# Patient Record
Sex: FEMALE | Race: WHITE | NOT HISPANIC OR LATINO | Employment: FULL TIME | URBAN - METROPOLITAN AREA
[De-identification: names, ages, dates, MRNs, and addresses within clinical notes are randomized per-mention and may not be internally consistent; named-entity substitution may affect disease eponyms.]

---

## 2021-04-28 ENCOUNTER — OFFICE VISIT (OUTPATIENT)
Dept: FAMILY MEDICINE CLINIC | Facility: CLINIC | Age: 26
End: 2021-04-28

## 2021-04-28 VITALS
TEMPERATURE: 97.7 F | HEART RATE: 95 BPM | OXYGEN SATURATION: 99 % | SYSTOLIC BLOOD PRESSURE: 102 MMHG | DIASTOLIC BLOOD PRESSURE: 70 MMHG | BODY MASS INDEX: 22.02 KG/M2 | HEIGHT: 66 IN | WEIGHT: 137 LBS

## 2021-04-28 DIAGNOSIS — Z12.83 SCREENING EXAM FOR SKIN CANCER: ICD-10-CM

## 2021-04-28 DIAGNOSIS — F07.81 POST CONCUSSIVE SYNDROME: Primary | ICD-10-CM

## 2021-04-28 DIAGNOSIS — Z12.4 PAP SMEAR FOR CERVICAL CANCER SCREENING: ICD-10-CM

## 2021-04-28 PROCEDURE — 99203 OFFICE O/P NEW LOW 30 MIN: CPT | Performed by: FAMILY MEDICINE

## 2021-04-28 RX ORDER — METHYLPHENIDATE HYDROCHLORIDE 36 MG/1
36 TABLET ORAL EVERY MORNING
Qty: 30 TABLET | Refills: 0 | Status: SHIPPED | OUTPATIENT
Start: 2021-04-28 | End: 2021-06-09 | Stop reason: SDUPTHER

## 2021-04-28 RX ORDER — FAMOTIDINE 40 MG/1
TABLET, FILM COATED ORAL
COMMUNITY
Start: 2021-03-10 | End: 2021-04-28

## 2021-04-28 NOTE — PROGRESS NOTES
Riley Marcial is a 25 y.o. female who presents today to [facility] care.    Chief Complaint   Patient presents with   • [facility]     Patient recently moved from [facility]        She recently moved to [place] for work from [place], [place]. She last saw her PCP 1 year ago. Patient has no acute complaints but requests a refill of her Concerta today. She has been taking it for difficulty concentrating since she was 17 after a concussive head injury for which she was treated at [facility]. She notes that she has been out since last July, but due to the pandemic and work she was unable to get in to get it refilled. She would like to get back on it today, however, because she is staring graduate school in 2 weeks. She is up to date on all her vaccinations and is scheduled for her second COVID shot next week. She does not currently see an eye doctor. She has never had a pap smear and would like to be referred to a gyn in [place]. She would also like to see a dermatologist here as well as she has several moles on her trunk that are raised and have become irritating to her. Her last labs were in 2019 and she would like her thyroid checked today due to family history.        PHQ-9 Depression Screening  Little interest or pleasure in doing things? 0   Feeling down, depressed, or hopeless? 0   Trouble falling or staying asleep, or sleeping too much?     Feeling tired or having little energy?     Poor appetite or overeating?     Feeling bad about yourself - or that you are a failure or have let yourself or your family down?     Trouble concentrating on things, such as reading the newspaper or watching television?     Moving or speaking so slowly that other people could have noticed? Or the opposite - being so fidgety or restless that you have been moving around a lot more than usual?     Thoughts that you would be better off dead, or of hurting yourself in some way?     PHQ-9 Total  "Score 0   If you checked off any problems, how difficult have these problems made it for you to do your work, take care of things at home, or get along with other people?         Past Medical History:   Diagnosis Date   • Mental confusion         Past Surgical History:   Procedure Laterality Date   • TONSILLECTOMY          Family History   Problem Relation Age of Onset   • Hypothyroidism Mother    • Hyperthyroidism Father    • Depression Sister    • Anxiety disorder Sister    • Stroke Sister         at birth with migraines and seizures   • Lupus Maternal Grandmother    • Colon cancer Maternal Grandfather 72   • Stroke Paternal Grandmother    • Dementia Paternal Grandmother    • Heart attack Paternal Grandfather    • Transient ischemic attack Paternal Grandfather    • Dementia Paternal Grandfather         Social History     Socioeconomic History   • Marital status: Single     Spouse name: Not on file   • Number of children: Not on file   • Years of education: Not on file   • Highest education level: Not on file   Tobacco Use   • Smoking status: Never Smoker   • Smokeless tobacco: Never Used   Substance and Sexual Activity   • Alcohol use: Yes     Comment: socially   • Drug use: Never   • Sexual activity: Yes     Birth control/protection: Condom     Comment: 1 male partner in the last year         No LMP recorded.    No current outpatient medications on file prior to visit.     No current facility-administered medications on file prior to visit.       Allergies   Allergen Reactions   • Penicillins Hives        Visit Vitals  /70   Pulse 95   Temp 97.7 °F (36.5 °C)   Ht 167.6 cm (66\")   Wt 62.1 kg (137 lb)   SpO2 99%   Breastfeeding No   BMI 22.11 kg/m²        Physical Exam  Vitals reviewed.   Constitutional:       Appearance: Normal appearance. She is normal weight.   HENT:      Head: Normocephalic and atraumatic.   Eyes:      Extraocular Movements: Extraocular movements intact.   Cardiovascular:      Rate " and Rhythm: Normal rate.      Pulses: Normal pulses.      Heart sounds: Normal heart sounds.   Pulmonary:      Effort: Pulmonary effort is normal.      Breath sounds: Normal breath sounds.   Skin:     General: Skin is warm and dry.          Neurological:      General: No focal deficit present.      Mental Status: She is alert and oriented to person, place, and time. Mental status is at baseline.   Psychiatric:         Behavior: Behavior normal.               Problems Addressed this Visit        Genitourinary and Reproductive     Pap smear for cervical cancer screening    Relevant Orders    Ambulatory Referral to Obstetrics / Gynecology       Neuro    Post concussive syndrome - Primary     Psychological condition is unchanged.  Continue current treatment regimen.  CSA agreement was signed.  UDS was obtained.  Dusty was reviewed and was appropriate.  Psychological condition  will be reassessed in 3 months.         Relevant Medications    methylphenidate (Concerta) 36 MG CR tablet    Other Relevant Orders    Pain Management Profile (13 Drugs) Urine - Urine, Clean Catch       Skin    Screening exam for skin cancer     Patient has multiple nevi on her trunk which have been increasing in size which is likely secondary to friction from her bra.  Will refer to dermatology for further evaluation and treatment.         Relevant Orders    Ambulatory Referral to Dermatology      Diagnoses       Codes Comments    Post concussive syndrome    -  Primary ICD-10-CM: F07.81  ICD-9-CM: 310.2     Screening exam for skin cancer     ICD-10-CM: Z12.83  ICD-9-CM: V76.43     Pap smear for cervical cancer screening     ICD-10-CM: Z12.4  ICD-9-CM: V76.2           Return in about 3 months (around 7/28/2021) for Follow-up post concussive syndrome.    Parts of this office note have been dictated by voice recognition software. Grammatical and/or spelling errors may be present.    Kayode Burns MD   4/29/2021

## 2021-04-29 PROBLEM — Z12.83 SCREENING EXAM FOR SKIN CANCER: Status: ACTIVE | Noted: 2021-04-29

## 2021-04-29 PROBLEM — Z12.4 PAP SMEAR FOR CERVICAL CANCER SCREENING: Status: ACTIVE | Noted: 2021-04-29

## 2021-04-29 NOTE — ASSESSMENT & PLAN NOTE
Patient has multiple nevi on her trunk which have been increasing in size which is likely secondary to friction from her bra.  Will refer to dermatology for further evaluation and treatment.

## 2021-04-29 NOTE — ASSESSMENT & PLAN NOTE
Psychological condition is unchanged.  Continue current treatment regimen.  CSA agreement was signed.  UDS was obtained.  Dusty was reviewed and was appropriate.  Psychological condition  will be reassessed in 3 months.

## 2021-06-09 DIAGNOSIS — F07.81 POST CONCUSSIVE SYNDROME: ICD-10-CM

## 2021-06-09 RX ORDER — METHYLPHENIDATE HYDROCHLORIDE 36 MG/1
36 TABLET ORAL EVERY MORNING
Qty: 30 TABLET | Refills: 0 | Status: SHIPPED | OUTPATIENT
Start: 2021-06-09 | End: 2021-08-31 | Stop reason: SDUPTHER

## 2021-08-25 ENCOUNTER — OFFICE VISIT (OUTPATIENT)
Dept: OBSTETRICS AND GYNECOLOGY | Facility: CLINIC | Age: 26
End: 2021-08-25

## 2021-08-25 VITALS
WEIGHT: 125 LBS | SYSTOLIC BLOOD PRESSURE: 106 MMHG | BODY MASS INDEX: 20.18 KG/M2 | RESPIRATION RATE: 14 BRPM | DIASTOLIC BLOOD PRESSURE: 64 MMHG

## 2021-08-25 DIAGNOSIS — Z30.09 GENERAL COUNSELING AND ADVICE FOR CONTRACEPTIVE MANAGEMENT: ICD-10-CM

## 2021-08-25 DIAGNOSIS — Z01.419 ENCNTR FOR GYN EXAM (GENERAL) (ROUTINE) W/O ABN FINDINGS: Primary | ICD-10-CM

## 2021-08-25 PROCEDURE — 99385 PREV VISIT NEW AGE 18-39: CPT | Performed by: OBSTETRICS & GYNECOLOGY

## 2021-08-25 NOTE — PROGRESS NOTES
Subjective   Chief Complaint   Patient presents with   • Gynecologic Exam     Riley Marcial is a 25 y.o. year old  presenting to be seen for her annual exam.     SEXUAL Hx:  She is currently sexually active.  In the past year there there has been NO new sexual partners.    Condoms are always used.  She would not like to be screened for STD's at today's exam.  Current birth control method: condoms.  She is not happy with her current method of contraception and does not want to discuss alternative methods of contraception.  MENSTRUAL Hx:  Patient's last menstrual period was 2021 (approximate).  In the past 6 months her cycles have been regular, predictable and occur monthly.  Her menstrual flow is typically normal.   Each month on average there are roughly 0 day(s) of very heavy flow.    Intermenstrual bleeding is absent.    Post-coital bleeding is absent.  Dysmenorrhea: moderate and is not affecting her activities of daily living  PMS: is not affecting her activities of daily living  Her cycles are not a source of concern for her that she wishes to discuss today.  HEALTH Hx:  She exercises regularly: no (and has no plans to become more active).  She wears her seat belt: yes.  She has concerns about domestic violence: no.  OTHER THINGS SHE WANTS TO DISCUSS TODAY:  Nothing else    The following portions of the patient's history were reviewed and updated as appropriate:problem list, current medications, allergies, past family history, past medical history, past social history and past surgical history.    Social History    Tobacco Use      Smoking status: Never Smoker      Smokeless tobacco: Never Used    Review of Systems  Constitutional POS: nothing reported    NEG: anorexia or night sweats   Genitourinary POS: nothing reported    NEG: dysuria or hematuria      Gastointestinal POS: nothing reported    NEG: bloating, change in bowel habits, melena or reflux symptoms   Integument POS: nothing reported     NEG: moles that are changing in size, shape, color or rashes   Breast POS: nothing reported    NEG: persistent breast lump, skin dimpling or nipple discharge        Objective   /64   Resp 14   Wt 56.7 kg (125 lb)   LMP 08/20/2021 (Approximate)   Breastfeeding No   BMI 20.18 kg/m²     General:  well developed; well nourished  no acute distress   Skin:  No suspicious lesions seen   Thyroid: normal to inspection and palpation   Breasts:  Examined in supine position  Symmetric without masses or skin dimpling  Nipples normal without inversion, lesions or discharge  There are no palpable axillary nodes   Abdomen: soft, non-tender; no masses  no umbilical or inguinal hernias are present  no hepato-splenomegaly   Pelvis: Clinical staff was present for exam  External genitalia:  normal appearance of the external genitalia including Bartholin's and [place]'s glands.  :  urethral meatus normal;  Vaginal:  normal pink mucosa without prolapse or lesions.  Cervix:  normal appearance.  Uterus:  normal size, shape and consistency.  Adnexa:  normal bimanual exam of the adnexa.        Assessment   1. Normal GYN exam  2. Contraception Counseling     Plan   Pap was done today.  If she does not receive the results of the Pap within 2 weeks  time, she was instructed to call to find out the results.  I explained to Roxobel that the recommendations for Pap smear interval in a low risk patient's has lengthened to 3 years time.  I encouraged her to be seen yearly for a full physical exam including breast and pelvic exam even during the off years when PAP's will not be performed.  1. Contraceptive options including use, efficacy and side effect profile of intrauterine device, subdermal implant, pill, patch, shot and intravaginal ring were discussed. Patient would like to proceed with IUD placement.  2. Encouraged healthy diet and exercise.   3. No prescription was given or electronically sent at today's visit  4. The importance of  keeping all planned follow-up and taking all medications as prescribed was emphasized.  5. Follow up for annual exam and for IUD placement in 1 year and in 2 weeks, respectively.     No orders of the defined types were placed in this encounter.         This note was electronically signed.    Valerie Esparza DO    August 25, 2021    Note: Speech recognition transcription software may have been used to create portions of this document.  An attempt at proofreading has been made but errors in transcription could still be present

## 2021-08-31 ENCOUNTER — OFFICE VISIT (OUTPATIENT)
Dept: FAMILY MEDICINE CLINIC | Facility: CLINIC | Age: 26
End: 2021-08-31

## 2021-08-31 VITALS
HEART RATE: 88 BPM | BODY MASS INDEX: 20.41 KG/M2 | OXYGEN SATURATION: 99 % | DIASTOLIC BLOOD PRESSURE: 62 MMHG | WEIGHT: 127 LBS | TEMPERATURE: 97 F | SYSTOLIC BLOOD PRESSURE: 102 MMHG | HEIGHT: 66 IN

## 2021-08-31 DIAGNOSIS — F07.81 POST CONCUSSIVE SYNDROME: Primary | ICD-10-CM

## 2021-08-31 PROCEDURE — 99213 OFFICE O/P EST LOW 20 MIN: CPT | Performed by: FAMILY MEDICINE

## 2021-08-31 RX ORDER — METHYLPHENIDATE HYDROCHLORIDE 36 MG/1
36 TABLET ORAL EVERY MORNING
Qty: 30 TABLET | Refills: 0 | Status: SHIPPED | OUTPATIENT
Start: 2021-08-31 | End: 2021-10-06 | Stop reason: SDUPTHER

## 2021-08-31 NOTE — PROGRESS NOTES
"Riley Marcial is a 25 y.o. female who presents today for Post concussive syndrome (med refill )      Patient is doing well on concerta. He memory and focus are both at baseline. She denies jitteriness, weight loss, appetite loss, palpitations, or difficulty sleeping. She is doing well in her grad school classes. She has no acute complaints today.       Review of Systems   Constitutional: Negative for fever and unexpected weight loss.   HENT: Negative for congestion, ear pain and sore throat.    Eyes: Negative for visual disturbance.   Respiratory: Negative for cough, shortness of breath and wheezing.    Cardiovascular: Negative for chest pain and palpitations.   Gastrointestinal: Negative for abdominal pain, blood in stool, constipation, diarrhea, nausea, vomiting and GERD.   Endocrine: Negative for polydipsia and polyuria.   Genitourinary: Negative for difficulty urinating.   Musculoskeletal: Negative for joint swelling.   Skin: Negative for rash and skin lesions.   Allergic/Immunologic: Negative for environmental allergies.   Neurological: Negative for seizures and syncope.   Hematological: Does not bruise/bleed easily.   Psychiatric/Behavioral: Negative for suicidal ideas.        The following portions of the patient's history were reviewed and updated as appropriate: allergies, current medications, past family history, past medical history, past social history, past surgical history and problem list.    Current Outpatient Medications on File Prior to Visit   Medication Sig Dispense Refill   • [DISCONTINUED] methylphenidate (Concerta) 36 MG CR tablet Take 1 tablet by mouth Every Morning 30 tablet 0     No current facility-administered medications on file prior to visit.       Allergies   Allergen Reactions   • Penicillins Hives        Visit Vitals  /62   Pulse 88   Temp 97 °F (36.1 °C)   Ht 167.6 cm (66\")   Wt 57.6 kg (127 lb)   LMP 08/20/2021 (Approximate)   SpO2 99%   BMI 20.50 kg/m²        Physical " Exam  Constitutional:       General: She is not in acute distress.     Appearance: She is well-developed. She is not diaphoretic.   HENT:      Head: Atraumatic.   Cardiovascular:      Rate and Rhythm: Normal rate and regular rhythm.      Heart sounds: Normal heart sounds. No murmur heard.   No friction rub. No gallop.    Pulmonary:      Effort: Pulmonary effort is normal. No respiratory distress.      Breath sounds: Normal breath sounds. No stridor. No wheezing, rhonchi or rales.   Musculoskeletal:      Cervical back: Normal range of motion and neck supple.   Skin:     General: Skin is warm and dry.   Neurological:      Mental Status: She is alert and oriented to person, place, and time.   Psychiatric:         Behavior: Behavior normal.          Problems Addressed this Visit        Neuro    Post concussive syndrome - Primary     Psychological condition is improving with treatment.  Continue current treatment regimen.  Psychological condition  will be reassessed in 3 months.         Relevant Medications    methylphenidate (Concerta) 36 MG CR tablet      Diagnoses       Codes Comments    Post concussive syndrome    -  Primary ICD-10-CM: F07.81  ICD-9-CM: 310.2           Return in about 3 months (around 11/30/2021) for Follow-up med refills.    Parts of this office note have been dictated by voice recognition software. Grammatical and/or spelling errors may be present.    Kayode Burns MD   8/31/2021

## 2021-09-29 ENCOUNTER — OFFICE VISIT (OUTPATIENT)
Dept: OBSTETRICS AND GYNECOLOGY | Facility: CLINIC | Age: 26
End: 2021-09-29

## 2021-09-29 VITALS
RESPIRATION RATE: 14 BRPM | SYSTOLIC BLOOD PRESSURE: 112 MMHG | DIASTOLIC BLOOD PRESSURE: 70 MMHG | WEIGHT: 126 LBS | BODY MASS INDEX: 20.34 KG/M2

## 2021-09-29 DIAGNOSIS — Z30.014 ENCOUNTER FOR INITIAL PRESCRIPTION OF INTRAUTERINE CONTRACEPTIVE DEVICE (IUD): Primary | ICD-10-CM

## 2021-09-29 PROCEDURE — 58300 INSERT INTRAUTERINE DEVICE: CPT | Performed by: OBSTETRICS & GYNECOLOGY

## 2021-09-29 NOTE — PROGRESS NOTES
IUD Insertion    Patient's last menstrual period was 09/20/2021 (approximate).    Date of procedure:  9/29/2021    Risks and benefits discussed? yes  All questions answered? yes  Consents given by The patient  Written consent obtained? yes    Local anesthesia used:  no    Procedure documentation:    After verifying the patient had a low probability of being pregnant and met the criteria for insertion, a sterile speculum has placed and the cervix was cleansed with an antiseptic solution.  Vaginal discharge was scant.  The anterior lip of the cervix was grasped with a tenaculum and the uterine cavity was gently sounded. There was mild difficulty passing the sound through the cervix.  Cervical dilation did not need to be performed prior to placing the IUD.  The uterus was midposition and sounded to 6 cms.  The Kyleena was then prepared per the manufacturers instructions.    The Kyleena was advanced to a point 2 cms from the fundus and then the arms were released from the sheath.  The device was advanced to the fundus and the device was released fully from the sheath. The string was cut 3 cms in length.  Bleeding from the cervix was scant.    She tolerated the procedure without any difficulty.     Post procedure instructions: It was reviewed that the Kyleena will not alter the timing of when she bleeds but it may decrease the quantity of flow and cramps.  Many woman will be amenorrheic over time.  Efficacy rate of 99.2% over 5 years was discussed.  Spontaneous expulsion rate of 1-2% was also discussed.  If she has any issue with fever or excessive bleeding or pain she is to call the office immediately.  Otherwise I would like to see her back in 4 weeks. Given my departure from [facility], will arrange transition of care to one of my partners.     This note was electronically signed.    Valerie Esparza,   September 29, 2021

## 2021-10-06 DIAGNOSIS — F07.81 POST CONCUSSIVE SYNDROME: ICD-10-CM

## 2021-10-06 RX ORDER — METHYLPHENIDATE HYDROCHLORIDE 36 MG/1
36 TABLET ORAL EVERY MORNING
Qty: 30 TABLET | Refills: 0 | Status: SHIPPED | OUTPATIENT
Start: 2021-10-06 | End: 2021-12-01 | Stop reason: SDUPTHER

## 2021-10-27 ENCOUNTER — OFFICE VISIT (OUTPATIENT)
Dept: OBSTETRICS AND GYNECOLOGY | Facility: CLINIC | Age: 26
End: 2021-10-27

## 2021-10-27 VITALS
SYSTOLIC BLOOD PRESSURE: 112 MMHG | BODY MASS INDEX: 19.99 KG/M2 | WEIGHT: 124.4 LBS | RESPIRATION RATE: 14 BRPM | HEIGHT: 66 IN | DIASTOLIC BLOOD PRESSURE: 74 MMHG

## 2021-10-27 DIAGNOSIS — Z30.431 IUD CHECK UP: ICD-10-CM

## 2021-10-27 DIAGNOSIS — Z30.014 ENCOUNTER FOR INITIAL PRESCRIPTION OF INTRAUTERINE CONTRACEPTIVE DEVICE (IUD): Primary | ICD-10-CM

## 2021-10-27 PROCEDURE — 99212 OFFICE O/P EST SF 10 MIN: CPT | Performed by: STUDENT IN AN ORGANIZED HEALTH CARE EDUCATION/TRAINING PROGRAM

## 2021-10-27 RX ORDER — LEVONORGESTREL 19.5 MG/1
1 INTRAUTERINE DEVICE INTRAUTERINE ONCE
COMMUNITY
End: 2023-03-22 | Stop reason: SINTOL

## 2021-10-27 NOTE — PROGRESS NOTES
"Subjective   Chief Complaint   Patient presents with   • Follow-up     Patient is here today for Kyleena IUD string check. c/o moderate cramping daily except for 3 days and that was when she was on her period.     Riley Marcial is a 25 y.o. year old  presenting to be seen for follow-up of her recently placed Kyleena on 21. Since the time of insertion she has been sexually active.  Waka has not been painful for her.   Waka has not been painful for her partner. She has had slight daily cramping since insertion that is resolved with Ibuprofen. She started her period last  and is no longer bleeding; only has slight red/pink discharge.     OTHER THINGS SHE WANTS TO DISCUSS TODAY:  Nothing else       Objective   /74 (BP Location: Right arm, Patient Position: Sitting, Cuff Size: Adult)   Resp 14   Ht 167.6 cm (66\")   Wt 56.4 kg (124 lb 6.4 oz)   LMP 10/24/2021   Breastfeeding No   BMI 20.08 kg/m²     Imaging   No data reviewed       Assessment   1. IUD in place     Plan   1. IUD strings visualized, reassurance provided.  Discussed if cramping acutely worsens or has acutely worsening bleeding, patient encouraged to return to our office for repeat evaluation and or ultrasound.  Patient verbalized understanding.  2. The importance of keeping all planned follow-up and taking all medications as prescribed was emphasized.  3. Follow up for annual exam in 1-2 years    No orders of the defined types were placed in this encounter.           This note was electronically signed.    Charla Hope MD   2021  "

## 2021-12-01 DIAGNOSIS — F07.81 POST CONCUSSIVE SYNDROME: ICD-10-CM

## 2021-12-01 RX ORDER — METHYLPHENIDATE HYDROCHLORIDE 36 MG/1
36 TABLET ORAL EVERY MORNING
Qty: 30 TABLET | Refills: 0 | Status: SHIPPED | OUTPATIENT
Start: 2021-12-01 | End: 2022-01-11 | Stop reason: SDUPTHER

## 2022-01-11 ENCOUNTER — TELEMEDICINE (OUTPATIENT)
Dept: FAMILY MEDICINE CLINIC | Facility: CLINIC | Age: 27
End: 2022-01-11

## 2022-01-11 DIAGNOSIS — F07.81 POST CONCUSSIVE SYNDROME: ICD-10-CM

## 2022-01-11 PROCEDURE — 99214 OFFICE O/P EST MOD 30 MIN: CPT | Performed by: FAMILY MEDICINE

## 2022-01-11 RX ORDER — METHYLPHENIDATE HYDROCHLORIDE 36 MG/1
36 TABLET ORAL EVERY MORNING
Qty: 30 TABLET | Refills: 0 | Status: SHIPPED | OUTPATIENT
Start: 2022-01-11 | End: 2022-03-06 | Stop reason: SDUPTHER

## 2022-01-11 NOTE — PROGRESS NOTES
Riley Marcial is a 26 y.o. female who presents today for Med Refill (post consussive sydrome)      You have chosen to receive care through a telemedicine visit. Do you consent to use a telemedicine visit for your medical care today? Yes.    Patient is working at [employer] and taking master's courses in finance.  Reports she sleeps an average of 7 hours per night.  Denies decreased appetite, or recent changes in weight.  Also denies any nervousness, heart palpitations, jittery feelings.  Patient does not have any physical complaints today.         Review of Systems   Constitutional: Negative.    HENT: Negative.    Eyes: Negative.    Respiratory: Negative.    Cardiovascular: Negative.    Gastrointestinal: Negative.    Endocrine: Negative.    Genitourinary: Negative.    Musculoskeletal: Negative.    Allergic/Immunologic: Negative.    Neurological: Negative.    Hematological: Negative.    Psychiatric/Behavioral: Negative.         The following portions of the patient's history were reviewed and updated as appropriate: allergies, current medications, past family history, past medical history, past social history, past surgical history and problem list.    Current Outpatient Medications on File Prior to Visit   Medication Sig Dispense Refill   • levonorgestrel (Kyleena) 19.5 MG intrauterine device IUD 1 each by Intrauterine route 1 (One) Time.     • [DISCONTINUED] methylphenidate (Concerta) 36 MG CR tablet Take 1 tablet by mouth Every Morning 30 tablet 0     No current facility-administered medications on file prior to visit.       Allergies   Allergen Reactions   • Penicillins Hives        There were no vitals taken for this visit.     Physical Exam  Constitutional:       General: She is not in acute distress.     Appearance: She is well-developed.   Pulmonary:      Effort: Pulmonary effort is normal. No respiratory distress.   Neurological:      Mental Status: She is alert and oriented to person, place, and time.    Psychiatric:         Behavior: Behavior normal.           Unable to perform due to telemedicine visit.      Problems Addressed this Visit        Neuro    Post concussive syndrome     Psychological condition is unchanged.  Continue current treatment regimen. The patient is taking the following controlled substance(s) on a long term (greater than three months) basis: Methylphenidate (Concerta/Ritalin).  She is taking controlled substances for other (post concussive syndrome with decreased concentration).  A history was obtained from the patient and the following were reviewed: family history, social history, psychiatric history, and substance abuse history.  A baseline assessment was performed and includes a controlled substance agreement, urine drug screen, FARHAN (within 12 months prior to today's encounter), and a physical exam, if appropriate, was performed within the past year.  It is medically appropriate to prescribe her the medication(s) above.  If applicable, prior treatment records were obtained and reviewed to justify long term prescribing of controlled substances.  The patient was educated on the following: Limited use of the medication, need to discontinue the medication when her condition resolves, proper storage and disposal of medication(s), and risks and dangers associated with controlled substances including tolerance, dependence, and addiction.  A written informed consent was obtained and includes objectives of treatment, further diagnostic testing if appropriate, and an exit strategy for discontinuing the medication on the condition resolves, if appropriate.  In addition, if appropriate, the patient will be screened for other medical conditions that may impact the prescribing of controlled substances.  Previous treatments have been tried including trials of noncontrolled substances or lower doses of controlled substances.  The controlled medication(s) have been titrated to the level appropriate  and necessary and the medication(s) are not causing unacceptable side effects.             Relevant Medications    methylphenidate (Concerta) 36 MG CR tablet      Diagnoses       Codes Comments    Post concussive syndrome     ICD-10-CM: F07.81  ICD-9-CM: 310.2           Return in about 3 months (around 4/11/2022) for Annual.    This was an audio and video enabled telemedicine encounter.    I attest that I have reviewed the student note and that the components of the history of the present illness, the physical exam, and the assessment and plan documented were performed by me or were performed in my presence by the student and verified by me.    Kayode Burns MD   1/11/2022

## 2022-01-11 NOTE — ASSESSMENT & PLAN NOTE
Psychological condition is unchanged.  Continue current treatment regimen. The patient is taking the following controlled substance(s) on a long term (greater than three months) basis: Methylphenidate (Concerta/Ritalin).  She is taking controlled substances for other (post concussive syndrome with decreased concentration).  A history was obtained from the patient and the following were reviewed: family history, social history, psychiatric history, and substance abuse history.  A baseline assessment was performed and includes a controlled substance agreement, urine drug screen, FARHAN (within 12 months prior to today's encounter), and a physical exam, if appropriate, was performed within the past year.  It is medically appropriate to prescribe her the medication(s) above.  If applicable, prior treatment records were obtained and reviewed to justify long term prescribing of controlled substances.  The patient was educated on the following: Limited use of the medication, need to discontinue the medication when her condition resolves, proper storage and disposal of medication(s), and risks and dangers associated with controlled substances including tolerance, dependence, and addiction.  A written informed consent was obtained and includes objectives of treatment, further diagnostic testing if appropriate, and an exit strategy for discontinuing the medication on the condition resolves, if appropriate.  In addition, if appropriate, the patient will be screened for other medical conditions that may impact the prescribing of controlled substances.  Previous treatments have been tried including trials of noncontrolled substances or lower doses of controlled substances.  The controlled medication(s) have been titrated to the level appropriate and necessary and the medication(s) are not causing unacceptable side effects.

## 2022-02-17 DIAGNOSIS — F07.81 POST CONCUSSIVE SYNDROME: ICD-10-CM

## 2022-02-17 RX ORDER — METHYLPHENIDATE HYDROCHLORIDE 36 MG/1
36 TABLET ORAL EVERY MORNING
Qty: 30 TABLET | Refills: 0 | Status: CANCELLED | OUTPATIENT
Start: 2022-02-17

## 2022-03-06 DIAGNOSIS — F07.81 POST CONCUSSIVE SYNDROME: ICD-10-CM

## 2022-03-07 RX ORDER — METHYLPHENIDATE HYDROCHLORIDE 36 MG/1
36 TABLET ORAL EVERY MORNING
Qty: 30 TABLET | Refills: 0 | Status: SHIPPED | OUTPATIENT
Start: 2022-03-07 | End: 2022-04-11 | Stop reason: SDUPTHER

## 2022-04-11 ENCOUNTER — OFFICE VISIT (OUTPATIENT)
Dept: FAMILY MEDICINE CLINIC | Facility: CLINIC | Age: 27
End: 2022-04-11

## 2022-04-11 VITALS
TEMPERATURE: 98.6 F | DIASTOLIC BLOOD PRESSURE: 70 MMHG | HEIGHT: 66 IN | BODY MASS INDEX: 23.14 KG/M2 | HEART RATE: 83 BPM | SYSTOLIC BLOOD PRESSURE: 116 MMHG | WEIGHT: 144 LBS | OXYGEN SATURATION: 97 %

## 2022-04-11 DIAGNOSIS — Z00.00 WELL ADULT EXAM: Primary | ICD-10-CM

## 2022-04-11 DIAGNOSIS — F07.81 POST CONCUSSIVE SYNDROME: ICD-10-CM

## 2022-04-11 PROCEDURE — 90715 TDAP VACCINE 7 YRS/> IM: CPT | Performed by: FAMILY MEDICINE

## 2022-04-11 PROCEDURE — 99395 PREV VISIT EST AGE 18-39: CPT | Performed by: FAMILY MEDICINE

## 2022-04-11 PROCEDURE — 90471 IMMUNIZATION ADMIN: CPT | Performed by: FAMILY MEDICINE

## 2022-04-11 PROCEDURE — 99214 OFFICE O/P EST MOD 30 MIN: CPT | Performed by: FAMILY MEDICINE

## 2022-04-11 RX ORDER — METHYLPHENIDATE HYDROCHLORIDE 54 MG/1
54 TABLET ORAL EVERY MORNING
Qty: 30 TABLET | Refills: 0 | Status: SHIPPED | OUTPATIENT
Start: 2022-04-11 | End: 2022-05-18 | Stop reason: SDUPTHER

## 2022-04-11 NOTE — PROGRESS NOTES
Riley Marcial is a 26 y.o. female who presents today for a well woman exam.    Chief Complaint   Patient presents with   • Annual Exam        Last pap smear was in Sept 2021, results were normal. No history of abnormal pap smears. No family history of cervical, ovarian, or uterine cancer.     Sexually active with one male partner. No vaginal discharge, itching, dysuria, or pelvic pain. Not interested in screening for STIs.     Patient has never had a mammogram. Patient has maternal aunt had breast cancer at 41. Her aunt and mother underwent genetic testing that was negative    Diet is regular.     Physical activity includes walking    No sleep problems. Average 7 hours per night.     No mood problems.     She is not up to date to on dental and optometry. She has not seen a dermatologist yet.     Patient is up to date on all immunizations except TDaP and the annual flu vaccine. She is agreeable to receiving that today. She states she thinks she had the HPV vaccine with her pediatrician and she can get the records.    Patient wants to mention that she had a right breast mass that she believes to be a cyst. last month around her menstrual cycle. Patient states that it was present for a few weeks and has resolved since last week. She states it was painful for the first few days and it resolved. She states this is the first time she has ever had a cyst.    Patient is still using the Concerta daily in the morning. She thinks she is developing a tolerance to it because it doesn't help with her concentration over the last 6 months. She states she cannot tell a difference between when she takes it and when she doesn't. She states sometimes she may not take it on the weekends but she usually takes it every day. She denies palpitations, unexpected weight loss, sleep issues, or decrease in appetite.        PHQ-2/PHQ-9 Depression Screening 4/11/2022   Retired PHQ-9 Total Score -   Retired Total Score -   Little Interest or  Pleasure in Doing Things 0-->not at all   Feeling Down, Depressed or Hopeless 0-->not at all   PHQ-9: Brief Depression Severity Measure Score 0       Review of Systems   Constitutional: Negative for chills and fever.   Eyes: Negative for blurred vision.   Respiratory: Negative for shortness of breath.    Cardiovascular: Negative for chest pain.   Gastrointestinal: Negative for abdominal pain, constipation, diarrhea, nausea and vomiting.   Endocrine: Negative for polydipsia and polyuria.   Musculoskeletal: Negative for back pain.   Allergic/Immunologic: Negative for environmental allergies.   Neurological: Negative for dizziness, syncope, numbness and headache.   Psychiatric/Behavioral: Negative for suicidal ideas and depressed mood. The patient is not nervous/anxious.         Health Maintenance   Topic Date Due   • CHLAMYDIA SCREENING  Never done   • INFLUENZA VACCINE  2022   • PAP SMEAR  2024   • TDAP/TD VACCINES (2 - Td or Tdap) 2032       Obstetric History:  OB History        0    Para   0    Term   0       0    AB   0    Living   0       SAB   0    IAB   0    Ectopic   0    Molar   0    Multiple   0    Live Births   0               Menstrual History:     Patient's last menstrual period was 2022.         Past Medical History:   Diagnosis Date   • IUD (intrauterine device) in place 2021    Kyleena   • Mental confusion         Past Surgical History:   Procedure Laterality Date   • TONSILLECTOMY          Family History   Problem Relation Age of Onset   • Hypothyroidism Mother    • Hyperthyroidism Father    • Depression Sister    • Anxiety disorder Sister    • Stroke Sister         at birth with migraines and seizures   • Breast cancer Maternal Aunt    • Colon cancer Paternal Uncle    • Lupus Maternal Grandmother    • Stroke Paternal Grandmother    • Dementia Paternal Grandmother    • Heart attack Paternal Grandfather    • Transient ischemic attack Paternal Grandfather    •  "Dementia Paternal Grandfather         Social History     Socioeconomic History   • Marital status: Single   Tobacco Use   • Smoking status: Never Smoker   • Smokeless tobacco: Never Used   Substance and Sexual Activity   • Alcohol use: Yes     Comment: socially   • Drug use: Never   • Sexual activity: Yes     Partners: Male     Birth control/protection: Condom, I.U.D.     Comment:  only        Current Outpatient Medications on File Prior to Visit   Medication Sig Dispense Refill   • levonorgestrel (Kyleena) 19.5 MG intrauterine device IUD 1 each by Intrauterine route 1 (One) Time.     • [DISCONTINUED] methylphenidate (Concerta) 36 MG CR tablet Take 1 tablet by mouth Every Morning 30 tablet 0     No current facility-administered medications on file prior to visit.       Allergies   Allergen Reactions   • Penicillins Hives        Visit Vitals  /70   Pulse 83   Temp 98.6 °F (37 °C)   Ht 167.6 cm (66\")   Wt 65.3 kg (144 lb)   LMP 04/09/2022   SpO2 97%   BMI 23.24 kg/m²        Physical Exam  Constitutional:       Appearance: Normal appearance.   HENT:      Head: Atraumatic.   Cardiovascular:      Rate and Rhythm: Normal rate and regular rhythm.      Pulses: Normal pulses.      Heart sounds: Normal heart sounds. No murmur heard.    No friction rub. No gallop.   Pulmonary:      Effort: Pulmonary effort is normal. No respiratory distress.      Breath sounds: Normal breath sounds. No stridor. No wheezing, rhonchi or rales.   Chest:      Chest wall: No tenderness.   Abdominal:      General: Bowel sounds are normal. There is no distension.      Palpations: Abdomen is soft. There is no mass.      Tenderness: There is no abdominal tenderness. There is no guarding or rebound.      Hernia: No hernia is present.   Lymphadenopathy:      Cervical: No cervical adenopathy.   Skin:     General: Skin is warm and dry.   Neurological:      Mental Status: She is alert and oriented to person, place, and time.   Psychiatric:    "      Mood and Affect: Mood normal.         Behavior: Behavior normal.          Immunization History   Administered Date(s) Administered   • COVID-19 (MODERNA) 1st, 2nd, 3rd Dose Only 04/02/2021   • COVID-19 (MODERNA) BOOSTER 04/02/2021, 12/28/2021   • Tdap 04/11/2022       Problems Addressed this Visit        Health Encounters    Well adult exam - Primary     The patient is here for health maintenance visit.  Currently, the patient consumes a healthy diet and has an adequate exercise regimen.  Screening lab work is ordered.  Immunizations were reviewed today.  Advice and education was given regarding nutrition, aerobic exercise, routine dental evaluations, routine eye exams, reproductive health, cardiovascular risk reduction, sunscreen use, self skin examination (annual dermatology evaluations) and seatbelt use (general overall safety).  Further recommendations will be given if needed after lab evaluation.  Annual wellness evaluation is recommended.             Relevant Orders    Tdap Vaccine Greater Than or Equal To 6yo IM (Completed)       Neuro    Post concussive syndrome     Psychological condition is worsening as patient no longer feels that the Concerta is helping with her concentration..  Will increase Concerta to 54 mg daily.  CSA agreement was signed.  UDS was obtained.  Dusty reviewed and appropriate.  Psychological condition  will be reassessed in 3 months.           Relevant Medications    methylphenidate (Concerta) 54 MG CR tablet    Other Relevant Orders    Compliance Drug Analysis, Ur - Urine, Clean Catch      Diagnoses       Codes Comments    Well adult exam    -  Primary ICD-10-CM: Z00.00  ICD-9-CM: V70.0     Post concussive syndrome     ICD-10-CM: F07.81  ICD-9-CM: 310.2           Patient's Body mass index is 23.24 kg/m².     I attest that I have reviewed the student note and that the components of the history of the present illness, the physical exam, and the assessment and plan documented were  performed by me or were performed in my presence by the student and verified by me.     Return in about 3 months (around 7/11/2022) for Follow-up post concussive syndrome..    Kayode Burns MD  4/11/2022

## 2022-04-11 NOTE — ASSESSMENT & PLAN NOTE
Psychological condition is worsening as patient no longer feels that the Concerta is helping with her concentration..  Will increase Concerta to 54 mg daily.  CSA agreement was signed.  UDS was obtained.  Dusty reviewed and appropriate.  Psychological condition  will be reassessed in 3 months.

## 2022-04-15 LAB — DRUGS UR: NORMAL

## 2022-05-18 DIAGNOSIS — F07.81 POST CONCUSSIVE SYNDROME: ICD-10-CM

## 2022-05-18 RX ORDER — METHYLPHENIDATE HYDROCHLORIDE 54 MG/1
54 TABLET ORAL EVERY MORNING
Qty: 30 TABLET | Refills: 0 | Status: SHIPPED | OUTPATIENT
Start: 2022-05-18 | End: 2022-06-29 | Stop reason: SDUPTHER

## 2022-05-18 NOTE — TELEPHONE ENCOUNTER
Last Office Visit: 04/11/22  Next Office Visit:07/18/22  Last Refill Date:04/11/22  Quantity:30  Refills:0

## 2022-05-26 PROBLEM — Z51.81 THERAPEUTIC DRUG MONITORING: Status: ACTIVE | Noted: 2022-05-26

## 2022-06-29 DIAGNOSIS — F07.81 POST CONCUSSIVE SYNDROME: ICD-10-CM

## 2022-06-29 RX ORDER — METHYLPHENIDATE HYDROCHLORIDE 54 MG/1
54 TABLET ORAL EVERY MORNING
Qty: 30 TABLET | Refills: 0 | Status: SHIPPED | OUTPATIENT
Start: 2022-06-29 | End: 2022-07-25 | Stop reason: SDUPTHER

## 2022-07-25 ENCOUNTER — TELEPHONE (OUTPATIENT)
Dept: FAMILY MEDICINE CLINIC | Facility: CLINIC | Age: 27
End: 2022-07-25

## 2022-07-25 ENCOUNTER — TELEMEDICINE (OUTPATIENT)
Dept: FAMILY MEDICINE CLINIC | Facility: CLINIC | Age: 27
End: 2022-07-25

## 2022-07-25 DIAGNOSIS — F07.81 POST CONCUSSIVE SYNDROME: Primary | ICD-10-CM

## 2022-07-25 PROCEDURE — 99214 OFFICE O/P EST MOD 30 MIN: CPT | Performed by: FAMILY MEDICINE

## 2022-07-25 RX ORDER — METHYLPHENIDATE HYDROCHLORIDE 54 MG/1
54 TABLET ORAL EVERY MORNING
Qty: 30 TABLET | Refills: 0 | Status: SHIPPED | OUTPATIENT
Start: 2022-07-25 | End: 2022-09-23 | Stop reason: SDUPTHER

## 2022-07-25 NOTE — ASSESSMENT & PLAN NOTE
Psychological condition is unchanged.  Continue current treatment regimen.  Psychological condition  will be reassessed in 3 months. The patient is taking the following controlled substance(s) on a long term (greater than three months) basis: Methylphenidate (Concerta/Ritalin).  She is taking controlled substances for other (chronic post concussive syndrome).  A history was obtained from the patient and the following were reviewed: family history, social history, psychiatric history, and substance abuse history.  A baseline assessment was performed and includes a controlled substance agreement, urine drug screen, FARHAN (within 12 months prior to today's encounter), and a physical exam, if appropriate, was performed within the past year.  It is medically appropriate to prescribe her the medication(s) above.  If applicable, prior treatment records were obtained and reviewed to justify long term prescribing of controlled substances.  The patient was educated on the following: Limited use of the medication, need to discontinue the medication when her condition resolves, proper storage and disposal of medication(s), and risks and dangers associated with controlled substances including tolerance, dependence, and addiction.  A written informed consent was obtained and includes objectives of treatment, further diagnostic testing if appropriate, and an exit strategy for discontinuing the medication on the condition resolves, if appropriate.  In addition, if appropriate, the patient will be screened for other medical conditions that may impact the prescribing of controlled substances.  Previous treatments have been tried including trials of noncontrolled substances or lower doses of controlled substances.  The controlled medication(s) have been titrated to the level appropriate and necessary and the medication(s) are not causing unacceptable side effects.

## 2022-07-25 NOTE — PROGRESS NOTES
Riley Marcial is a 26 y.o. female who presents today for post concussive syndrome    You have chosen to receive care through a telemedicine visit. Do you consent to use a telemedicine visit for your medical care today? Yes.    Patient feels the concerta is working well for her. It helps with concentration. She is not having any adverse effects from medication. She denies weight loss, appetite change, sleep disturbance, palpitations, irritability, and jitteriness. She has no acute complaints today.        Review of Systems   Constitutional: Negative for fever and unexpected weight loss.   HENT: Negative for congestion, ear pain and sore throat.    Eyes: Negative for visual disturbance.   Respiratory: Negative for cough, shortness of breath and wheezing.    Cardiovascular: Negative for chest pain and palpitations.   Gastrointestinal: Negative for abdominal pain, blood in stool, constipation, diarrhea, nausea, vomiting and GERD.   Endocrine: Negative for polydipsia and polyuria.   Genitourinary: Negative for difficulty urinating.   Musculoskeletal: Negative for joint swelling.   Skin: Negative for rash and skin lesions.   Allergic/Immunologic: Negative for environmental allergies.   Neurological: Negative for seizures and syncope.   Hematological: Does not bruise/bleed easily.   Psychiatric/Behavioral: Negative for suicidal ideas.        The following portions of the patient's history were reviewed and updated as appropriate: allergies, current medications, past family history, past medical history, past social history, past surgical history and problem list.    Current Outpatient Medications on File Prior to Visit   Medication Sig Dispense Refill   • levonorgestrel (Kyleena) 19.5 MG intrauterine device IUD 1 each by Intrauterine route 1 (One) Time.     • [DISCONTINUED] methylphenidate (Concerta) 54 MG CR tablet Take 1 tablet by mouth Every Morning 30 tablet 0     No current facility-administered medications on file  prior to visit.       Allergies   Allergen Reactions   • Penicillins Hives        There were no vitals taken for this visit.     Physical Exam  Constitutional:       General: She is not in acute distress.     Appearance: She is well-developed.   Pulmonary:      Effort: Pulmonary effort is normal. No respiratory distress.   Neurological:      Mental Status: She is alert and oriented to person, place, and time.   Psychiatric:         Behavior: Behavior normal.          Results for orders placed or performed in visit on 04/11/22   Compliance Drug Analysis, Ur - Urine, Clean Catch    Specimen: Urine, Clean Catch    Urine  Release to sivan   Result Value Ref Range    Report Summary FINAL         Problems Addressed this Visit        Neuro    Post concussive syndrome - Primary     Psychological condition is unchanged.  Continue current treatment regimen.  Psychological condition  will be reassessed in 3 months. The patient is taking the following controlled substance(s) on a long term (greater than three months) basis: Methylphenidate (Concerta/Ritalin).  She is taking controlled substances for other (chronic post concussive syndrome).  A history was obtained from the patient and the following were reviewed: family history, social history, psychiatric history, and substance abuse history.  A baseline assessment was performed and includes a controlled substance agreement, urine drug screen, FARHAN (within 12 months prior to today's encounter), and a physical exam, if appropriate, was performed within the past year.  It is medically appropriate to prescribe her the medication(s) above.  If applicable, prior treatment records were obtained and reviewed to justify long term prescribing of controlled substances.  The patient was educated on the following: Limited use of the medication, need to discontinue the medication when her condition resolves, proper storage and disposal of medication(s), and risks and dangers associated  with controlled substances including tolerance, dependence, and addiction.  A written informed consent was obtained and includes objectives of treatment, further diagnostic testing if appropriate, and an exit strategy for discontinuing the medication on the condition resolves, if appropriate.  In addition, if appropriate, the patient will be screened for other medical conditions that may impact the prescribing of controlled substances.  Previous treatments have been tried including trials of noncontrolled substances or lower doses of controlled substances.  The controlled medication(s) have been titrated to the level appropriate and necessary and the medication(s) are not causing unacceptable side effects.               Relevant Medications    methylphenidate (Concerta) 54 MG CR tablet      Diagnoses       Codes Comments    Post concussive syndrome    -  Primary ICD-10-CM: F07.81  ICD-9-CM: 310.2           Return in about 3 months (around 10/25/2022) for Follow-up post concussive syndrome.    This was an audio and video enabled telemedicine encounter.    Kayode Burns MD   7/25/2022

## 2022-07-25 NOTE — TELEPHONE ENCOUNTER
[facility] staff attempted to follow warm transfer process and was unsuccessful     Caller: Riley Marcial    Relationship to patient: Self    Best call back number: [phone]     Patient is needing: PATIENT STATED THAT SHE HAS BEEN WAITING ON VIDEO VISIT FOR PROVIDER AND WANTED TO LET PROVIDER KNOW THAT SHE HAS TO GO INTO WORK HOWEVER SHE WILL WAIT FOR A FEW MORE MINS TO SEE IF PROVIDER DOES COME ON IF NOT SHE WILL HAVE TO GO BACK TO WORK AND RESCHEDULE

## 2022-09-23 DIAGNOSIS — F07.81 POST CONCUSSIVE SYNDROME: ICD-10-CM

## 2022-09-23 RX ORDER — METHYLPHENIDATE HYDROCHLORIDE 54 MG/1
54 TABLET ORAL EVERY MORNING
Qty: 30 TABLET | Refills: 0 | Status: SHIPPED | OUTPATIENT
Start: 2022-09-23 | End: 2022-11-21 | Stop reason: SDUPTHER

## 2022-09-23 NOTE — TELEPHONE ENCOUNTER
Rx Refill Note  Requested Prescriptions     Pending Prescriptions Disp Refills   • methylphenidate (Concerta) 54 MG CR tablet 30 tablet 0     Sig: Take 1 tablet by mouth Every Morning      Last office visit with prescribing clinician: 4/11/2022      Next office visit with prescribing clinician: Visit date not found            En Muse MA  09/23/22, 13:13 EDT

## 2022-11-21 DIAGNOSIS — F07.81 POST CONCUSSIVE SYNDROME: ICD-10-CM

## 2022-11-21 RX ORDER — METHYLPHENIDATE HYDROCHLORIDE 54 MG/1
54 TABLET ORAL EVERY MORNING
Qty: 30 TABLET | Refills: 0 | Status: SHIPPED | OUTPATIENT
Start: 2022-11-21 | End: 2023-01-04 | Stop reason: SDUPTHER

## 2022-11-21 NOTE — TELEPHONE ENCOUNTER
Rx Refill Note  Requested Prescriptions     Pending Prescriptions Disp Refills   • methylphenidate (Concerta) 54 MG CR tablet 30 tablet 0     Sig: Take 1 tablet by mouth Every Morning      Last office visit with prescribing clinician: 4/11/2022      Next office visit with prescribing clinician: Visit date not found            Yesenia Carrington  11/21/22, 15:32 EST

## 2023-01-04 ENCOUNTER — TELEMEDICINE (OUTPATIENT)
Dept: FAMILY MEDICINE CLINIC | Facility: CLINIC | Age: 28
End: 2023-01-04
Payer: COMMERCIAL

## 2023-01-04 DIAGNOSIS — F07.81 POST CONCUSSIVE SYNDROME: Primary | ICD-10-CM

## 2023-01-04 PROCEDURE — 99214 OFFICE O/P EST MOD 30 MIN: CPT | Performed by: FAMILY MEDICINE

## 2023-01-04 RX ORDER — METHYLPHENIDATE HYDROCHLORIDE 54 MG/1
54 TABLET ORAL EVERY MORNING
Qty: 30 TABLET | Refills: 0 | Status: SHIPPED | OUTPATIENT
Start: 2023-01-04 | End: 2023-02-01 | Stop reason: SDUPTHER

## 2023-01-04 NOTE — PROGRESS NOTES
Riley Marcial is a 27 y.o. female who presents today for Post concusssive syndrome    You have chosen to receive care through a telemedicine visit. Do you consent to use a telemedicine visit for your medical care today? Yes.    Patient is here to follow-up today on postconcussive syndrome for which she takes methylphenidate to help with concentration.  Patient states this works well for her.  She reports no adverse side effects.  She denies chest pain, palpitations, jitteriness, loss of appetite, sleep disturbance, agitation, and weight loss.       Review of Systems   Constitutional: Negative for fever and unexpected weight loss.   HENT: Negative for congestion, ear pain and sore throat.    Eyes: Negative for visual disturbance.   Respiratory: Negative for cough, shortness of breath and wheezing.    Cardiovascular: Negative for chest pain and palpitations.   Gastrointestinal: Negative for abdominal pain, blood in stool, constipation, diarrhea, nausea, vomiting and GERD.   Endocrine: Negative for polydipsia and polyuria.   Genitourinary: Negative for difficulty urinating.   Musculoskeletal: Negative for joint swelling.   Skin: Negative for rash and skin lesions.   Allergic/Immunologic: Negative for environmental allergies.   Neurological: Negative for seizures and syncope.   Hematological: Does not bruise/bleed easily.   Psychiatric/Behavioral: Negative for suicidal ideas.        The following portions of the patient's history were reviewed and updated as appropriate: allergies, current medications, past family history, past medical history, past social history, past surgical history and problem list.    Current Outpatient Medications on File Prior to Visit   Medication Sig Dispense Refill   • levonorgestrel (Kyleena) 19.5 MG intrauterine device IUD 1 each by Intrauterine route 1 (One) Time.     • [DISCONTINUED] methylphenidate (Concerta) 54 MG CR tablet Take 1 tablet by mouth Every Morning 30 tablet 0     No  current facility-administered medications on file prior to visit.       Allergies   Allergen Reactions   • Penicillins Hives        There were no vitals taken for this visit.     Physical Exam  Constitutional:       General: She is not in acute distress.     Appearance: She is well-developed.   Pulmonary:      Effort: Pulmonary effort is normal. No respiratory distress.   Neurological:      Mental Status: She is alert and oriented to person, place, and time.   Psychiatric:         Behavior: Behavior normal.          Results for orders placed or performed in visit on 04/11/22   Compliance Drug Analysis, Ur - Urine, Clean Catch    Specimen: Urine, Clean Catch    Urine  Release to sivan   Result Value Ref Range    Report Summary FINAL         Problems Addressed this Visit        Neuro    Post concussive syndrome - Primary     Chronic and stable.  Symptoms are well controlled with Concerta.  The patient is taking the following controlled substance(s) on a long term (greater than three months) basis: Methylphenidate (Concerta/Ritalin).  She is taking controlled substances for other (Postconcussive syndrome).  A history was obtained from the patient and the following were reviewed: family history, social history, psychiatric history, and substance abuse history.  A baseline assessment was performed and includes a controlled substance agreement, urine drug screen, FARHAN (within 12 months prior to today's encounter), and a physical exam, if appropriate, was performed within the past year.  It is medically appropriate to prescribe her the medication(s) above.  If applicable, prior treatment records were obtained and reviewed to justify long term prescribing of controlled substances.  The patient was educated on the following: Limited use of the medication, need to discontinue the medication when her condition resolves, proper storage and disposal of medication(s), and risks and dangers associated with controlled substances  including tolerance, dependence, and addiction.  A written informed consent was obtained and includes objectives of treatment, further diagnostic testing if appropriate, and an exit strategy for discontinuing the medication on the condition resolves, if appropriate.  In addition, if appropriate, the patient will be screened for other medical conditions that may impact the prescribing of controlled substances.  Previous treatments have been tried including trials of noncontrolled substances or lower doses of controlled substances.  The controlled medication(s) have been titrated to the level appropriate and necessary and the medication(s) are not causing unacceptable side effects.             Relevant Medications    methylphenidate (Concerta) 54 MG CR tablet   Diagnoses       Codes Comments    Post concussive syndrome    -  Primary ICD-10-CM: F07.81  ICD-9-CM: 310.2           Return in about 14 weeks (around 4/12/2023) for Annual.    This was an audio and video enabled telemedicine encounter using [employer].    Kayode Burns MD   1/4/2023

## 2023-01-04 NOTE — ASSESSMENT & PLAN NOTE
Chronic and stable.  Symptoms are well controlled with Concerta.  The patient is taking the following controlled substance(s) on a long term (greater than three months) basis: Methylphenidate (Concerta/Ritalin).  She is taking controlled substances for other (Postconcussive syndrome).  A history was obtained from the patient and the following were reviewed: family history, social history, psychiatric history, and substance abuse history.  A baseline assessment was performed and includes a controlled substance agreement, urine drug screen, FARHAN (within 12 months prior to today's encounter), and a physical exam, if appropriate, was performed within the past year.  It is medically appropriate to prescribe her the medication(s) above.  If applicable, prior treatment records were obtained and reviewed to justify long term prescribing of controlled substances.  The patient was educated on the following: Limited use of the medication, need to discontinue the medication when her condition resolves, proper storage and disposal of medication(s), and risks and dangers associated with controlled substances including tolerance, dependence, and addiction.  A written informed consent was obtained and includes objectives of treatment, further diagnostic testing if appropriate, and an exit strategy for discontinuing the medication on the condition resolves, if appropriate.  In addition, if appropriate, the patient will be screened for other medical conditions that may impact the prescribing of controlled substances.  Previous treatments have been tried including trials of noncontrolled substances or lower doses of controlled substances.  The controlled medication(s) have been titrated to the level appropriate and necessary and the medication(s) are not causing unacceptable side effects.

## 2023-01-18 ENCOUNTER — OFFICE VISIT (OUTPATIENT)
Dept: OBSTETRICS AND GYNECOLOGY | Facility: CLINIC | Age: 28
End: 2023-01-18
Payer: COMMERCIAL

## 2023-01-18 ENCOUNTER — LAB (OUTPATIENT)
Dept: LAB | Facility: HOSPITAL | Age: 28
End: 2023-01-18
Payer: COMMERCIAL

## 2023-01-18 VITALS
WEIGHT: 148.4 LBS | HEIGHT: 66 IN | BODY MASS INDEX: 23.85 KG/M2 | SYSTOLIC BLOOD PRESSURE: 116 MMHG | DIASTOLIC BLOOD PRESSURE: 78 MMHG

## 2023-01-18 DIAGNOSIS — R10.32 LEFT LOWER QUADRANT PAIN: ICD-10-CM

## 2023-01-18 DIAGNOSIS — N92.6 IRREGULAR PERIODS: ICD-10-CM

## 2023-01-18 DIAGNOSIS — E04.9 ENLARGED THYROID: ICD-10-CM

## 2023-01-18 DIAGNOSIS — K59.00 DYSCHEZIA: ICD-10-CM

## 2023-01-18 DIAGNOSIS — N94.6 DYSMENORRHEA: ICD-10-CM

## 2023-01-18 DIAGNOSIS — Z01.411 ENCOUNTER FOR GYNECOLOGICAL EXAMINATION WITH ABNORMAL FINDING: Primary | ICD-10-CM

## 2023-01-18 LAB
DEPRECATED RDW RBC AUTO: 36.9 FL (ref 37–54)
ERYTHROCYTE [DISTWIDTH] IN BLOOD BY AUTOMATED COUNT: 11.5 % (ref 12.3–15.4)
HCT VFR BLD AUTO: 42.1 % (ref 34–46.6)
HGB BLD-MCNC: 14 G/DL (ref 12–15.9)
MCH RBC QN AUTO: 29.4 PG (ref 26.6–33)
MCHC RBC AUTO-ENTMCNC: 33.3 G/DL (ref 31.5–35.7)
MCV RBC AUTO: 88.4 FL (ref 79–97)
PLATELET # BLD AUTO: 338 10*3/MM3 (ref 140–450)
PMV BLD AUTO: 10.4 FL (ref 6–12)
RBC # BLD AUTO: 4.76 10*6/MM3 (ref 3.77–5.28)
T4 FREE SERPL-MCNC: 1.04 NG/DL (ref 0.93–1.7)
TSH SERPL DL<=0.05 MIU/L-ACNC: 0.84 UIU/ML (ref 0.27–4.2)
WBC NRBC COR # BLD: 6.1 10*3/MM3 (ref 3.4–10.8)

## 2023-01-18 PROCEDURE — 99395 PREV VISIT EST AGE 18-39: CPT | Performed by: NURSE PRACTITIONER

## 2023-01-18 PROCEDURE — 84443 ASSAY THYROID STIM HORMONE: CPT

## 2023-01-18 PROCEDURE — 85027 COMPLETE CBC AUTOMATED: CPT

## 2023-01-18 PROCEDURE — 84439 ASSAY OF FREE THYROXINE: CPT

## 2023-01-18 NOTE — PROGRESS NOTES
"Chief Complaint  Riley Marcail is a 27 y.o.  female presenting for Annual Exam (Please discuss removal of IUD.  Patient has Kyleena, placed 21.  Has cramping, irregular bleeding.)    History of Present Illness  Riley is a very pleasant 28yo nulligravid woman.  She has a Kyleena IUD (since 2021).  She has always had significant dysmenorrhea (had to be on OCPs during high school years to help the dysmenorrhea).  She now has more severe dysmenorrhea and some pelvic pain/ lower abd cramping (brianna LLQ) all through the month.  She does not have dyspareunia.  She has experienced dyschezia a few times, but not usually.  Her bleeding pattern has been slightly irregular with the IUD.      The following portions of the patient's history were reviewed and updated as appropriate: allergies, current medications, past family history, past medical history, past social history, past surgical history and problem list.    Allergies   Allergen Reactions   • Penicillins Hives         Current Outpatient Medications:   •  levonorgestrel (Kyleena) 19.5 MG intrauterine device IUD, 1 each by Intrauterine route 1 (One) Time., Disp: , Rfl:   •  methylphenidate (Concerta) 54 MG CR tablet, Take 1 tablet by mouth Every Morning, Disp: 30 tablet, Rfl: 0    Past Medical History:   Diagnosis Date   • IUD (intrauterine device) in place 2021    Kyleena   • Mental confusion         Past Surgical History:   Procedure Laterality Date   • TONSILLECTOMY         Objective  /78   Ht 167.6 cm (66\")   Wt 67.3 kg (148 lb 6.4 oz)   LMP 2023 (Approximate) Comment: Kyleena IUD  Breastfeeding No   BMI 23.95 kg/m²     Physical Exam  Vitals and nursing note reviewed. Exam conducted with a chaperone present.   Constitutional:       General: She is not in acute distress.     Appearance: Normal appearance. She is normal weight. She is not ill-appearing.   HENT:      Head: Normocephalic.   Neck:      Thyroid: Thyromegaly present. " No thyroid mass.      Comments: Moderate to large amt of diffuse thyroid enlargement;  I cannot appreciate any nodules.  (States she has a strong FamHx thyroid dysfx)  Cardiovascular:      Rate and Rhythm: Normal rate and regular rhythm.      Heart sounds: Normal heart sounds.   Pulmonary:      Effort: Pulmonary effort is normal.      Breath sounds: Normal breath sounds.   Chest:   Breasts:     Right: No inverted nipple, mass or nipple discharge.      Left: No inverted nipple, mass or nipple discharge.   Abdominal:      General: Bowel sounds are normal.      Palpations: Abdomen is soft. There is no mass.      Tenderness: There is no abdominal tenderness. There is no guarding or rebound.      Hernia: No hernia is present.      Comments: Abd soft, thin, no m-t.  Active BS all quads.   Genitourinary:     General: Normal vulva.      Labia:         Right: No rash, tenderness or lesion.         Left: No rash, tenderness or lesion.       Vagina: Normal. No erythema.      Cervix: No cervical motion tenderness, discharge, friability, lesion or erythema.      Uterus: Not enlarged and not tender.       Adnexa:         Right: No mass or tenderness.          Left: No mass or tenderness.        Comments: IUD strings are ~2cm long; no CMT.    Lymphadenopathy:      Upper Body:      Right upper body: No supraclavicular or axillary adenopathy.      Left upper body: No supraclavicular or axillary adenopathy.   Neurological:      Mental Status: She is alert.         Assessment/Plan   Diagnoses and all orders for this visit:    1. Encounter for gynecological examination with abnormal finding (Primary)    2. Dysmenorrhea    3. Dyschezia    4. Left lower quadrant pain  -     CBC (No Diff); Future  -     US Non-ob Transvaginal; Future    5. Enlarged thyroid  -     TSH; Future  -     T4, Free; Future    6. Irregular periods  -     TSH; Future  -     CBC (No Diff); Future  -     US Non-ob Transvaginal; Future        Procedures  Continue the  Kyleena for now.    Rule out thyroid dysfx.     19 to 39: Counseling/Anticipatory Guidance Discussed: family planning/contraception and breast cancer and self breast exams    Return in about 2 months (around 3/18/2023) for Recheck.    Natalie Machado, APRN  01/18/2023

## 2023-02-01 DIAGNOSIS — F07.81 POST CONCUSSIVE SYNDROME: ICD-10-CM

## 2023-02-01 RX ORDER — METHYLPHENIDATE HYDROCHLORIDE 54 MG/1
54 TABLET ORAL EVERY MORNING
Qty: 30 TABLET | Refills: 0 | Status: SHIPPED | OUTPATIENT
Start: 2023-02-01

## 2023-02-03 ENCOUNTER — TELEPHONE (OUTPATIENT)
Dept: FAMILY MEDICINE CLINIC | Facility: CLINIC | Age: 28
End: 2023-02-03
Payer: COMMERCIAL

## 2023-02-03 DIAGNOSIS — F07.81 POST CONCUSSIVE SYNDROME: Primary | ICD-10-CM

## 2023-02-03 NOTE — TELEPHONE ENCOUNTER
Pharmacy Name: [facility] [address] - [phone] [facility] [phone]      Pharmacy representative name: PALMIRA     Pharmacy representative phone number: [phone]    What medication are you calling in regards to: methylphenidate (Concerta) 54 MG CR tablet    What question does the pharmacy have: PHARMACY STATES THEY CANNOT GET THIS PARTICULAR PRESCRIPTION IN STOCK BUT CAN GET THE METHYLPHENIDATE ER 54 MG IN STOCK BUT WILL NEED PCP TO SEND OVER A SEPARATE PRESCRIPTION TO DISPENSE TO THE PATIENT.     Who is the provider that prescribed the medication: PCP    Additional notes:

## 2023-02-04 RX ORDER — METHYLPHENIDATE HYDROCHLORIDE 54 MG/1
54 TABLET, EXTENDED RELEASE ORAL DAILY
Qty: 30 TABLET | Refills: 0 | Status: SHIPPED | OUTPATIENT
Start: 2023-02-04 | End: 2023-02-06 | Stop reason: SDUPTHER

## 2023-02-06 ENCOUNTER — HOSPITAL ENCOUNTER (OUTPATIENT)
Dept: ULTRASOUND IMAGING | Facility: HOSPITAL | Age: 28
Discharge: HOME | End: 2023-02-06
Admitting: NURSE PRACTITIONER
Payer: COMMERCIAL

## 2023-02-06 DIAGNOSIS — F07.81 POST CONCUSSIVE SYNDROME: ICD-10-CM

## 2023-02-06 DIAGNOSIS — E04.9 ENLARGED THYROID: ICD-10-CM

## 2023-02-06 PROCEDURE — 76536 US EXAM OF HEAD AND NECK: CPT

## 2023-02-06 RX ORDER — METHYLPHENIDATE HYDROCHLORIDE 54 MG/1
54 TABLET, EXTENDED RELEASE ORAL DAILY
Qty: 30 TABLET | Refills: 0 | Status: SHIPPED | OUTPATIENT
Start: 2023-02-06 | End: 2023-03-09 | Stop reason: SDUPTHER

## 2023-02-06 NOTE — TELEPHONE ENCOUNTER
Rx Refill Note  Requested Prescriptions     Pending Prescriptions Disp Refills   • Methylphenidate HCl ER 54 MG tablet sustained-release 24 hour 30 tablet 0     Sig: Take 1 tablet by mouth Daily.      Last office visit with prescribing clinician: 4/11/2022   Last telemedicine visit with prescribing clinician: Visit date not found   Next office visit with prescribing clinician: Visit date not found                         Would you like a call back once the refill request has been completed: [] Yes [] No    If the office needs to give you a call back, can they leave a voicemail: [] Yes [] No    Huma Weiss MA  02/06/23, 15:32 EST

## 2023-03-09 DIAGNOSIS — F07.81 POST CONCUSSIVE SYNDROME: ICD-10-CM

## 2023-03-09 RX ORDER — METHYLPHENIDATE HYDROCHLORIDE 54 MG/1
54 TABLET, EXTENDED RELEASE ORAL DAILY
Qty: 30 TABLET | Refills: 0 | Status: SHIPPED | OUTPATIENT
Start: 2023-03-09 | End: 2023-03-16 | Stop reason: SDUPTHER

## 2023-03-09 NOTE — TELEPHONE ENCOUNTER
Rx Refill Note  Requested Prescriptions     Pending Prescriptions Disp Refills   • Methylphenidate HCl ER 54 MG tablet sustained-release 24 hour 30 tablet 0     Sig: Take 1 tablet by mouth Daily.      Last office visit with prescribing clinician: 4/11/2022   Last telemedicine visit with prescribing clinician: Visit date not found   Next office visit with prescribing clinician: Visit date not found                         Would you like a call back once the refill request has been completed: [] Yes [] No    If the office needs to give you a call back, can they leave a voicemail: [] Yes [] No    Huma Weiss MA  03/09/23, 09:39 EST

## 2023-03-16 DIAGNOSIS — F07.81 POST CONCUSSIVE SYNDROME: ICD-10-CM

## 2023-03-16 RX ORDER — METHYLPHENIDATE HYDROCHLORIDE 54 MG/1
54 TABLET, EXTENDED RELEASE ORAL DAILY
Qty: 30 TABLET | Refills: 0 | Status: SHIPPED | OUTPATIENT
Start: 2023-03-16

## 2023-03-16 NOTE — TELEPHONE ENCOUNTER
Rx Refill Note  Requested Prescriptions     Pending Prescriptions Disp Refills   • Methylphenidate HCl ER 54 MG tablet sustained-release 24 hour 30 tablet 0     Sig: Take 1 tablet by mouth Daily.      Last office visit with prescribing clinician: 4/11/2022   Last telemedicine visit with prescribing clinician: Visit date not found   Next office visit with prescribing clinician: Visit date not found                         Would you like a call back once the refill request has been completed: [] Yes [] No    If the office needs to give you a call back, can they leave a voicemail: [] Yes [] No    Huma Weiss MA  03/16/23, 08:59 EDT

## 2023-03-22 ENCOUNTER — OFFICE VISIT (OUTPATIENT)
Dept: OBSTETRICS AND GYNECOLOGY | Facility: CLINIC | Age: 28
End: 2023-03-22
Payer: COMMERCIAL

## 2023-03-22 VITALS
WEIGHT: 150.4 LBS | DIASTOLIC BLOOD PRESSURE: 78 MMHG | BODY MASS INDEX: 24.17 KG/M2 | SYSTOLIC BLOOD PRESSURE: 112 MMHG | HEIGHT: 66 IN

## 2023-03-22 DIAGNOSIS — Z30.011 ENCOUNTER FOR INITIAL PRESCRIPTION OF CONTRACEPTIVE PILLS: ICD-10-CM

## 2023-03-22 DIAGNOSIS — N94.6 DYSMENORRHEA: ICD-10-CM

## 2023-03-22 DIAGNOSIS — Z30.432 ENCOUNTER FOR IUD REMOVAL: Primary | ICD-10-CM

## 2023-03-22 PROCEDURE — 58301 REMOVE INTRAUTERINE DEVICE: CPT | Performed by: NURSE PRACTITIONER

## 2023-03-22 PROCEDURE — 99213 OFFICE O/P EST LOW 20 MIN: CPT | Performed by: NURSE PRACTITIONER

## 2023-03-22 RX ORDER — NORGESTIMATE AND ETHINYL ESTRADIOL 0.25-0.035
1 KIT ORAL DAILY
Qty: 28 TABLET | Refills: 12 | Status: SHIPPED | OUTPATIENT
Start: 2023-03-22

## 2023-03-22 NOTE — PROGRESS NOTES
"Chief Complaint  Riley Marcial is a 27 y.o.  female presenting for Procedure (IUD removal.)    History of Present Illness  Riley is a very pleasant young 26yo, nulligravid woman.  She has a long hx dysmenorrhea (only with menstrual bleeding).  It was mostly relieved in the past with COCPs.  She has not felt well on the IUD.  She c/o worsened dysmenorrhea and pelvic cramping and pain all through the month.  She wants the IUD removed, and to go back on Sprintec.  Consent form was signed.    The following portions of the patient's history were reviewed and updated as appropriate: allergies, current medications, past family history, past medical history, past social history, past surgical history and problem list.    Allergies   Allergen Reactions   • Penicillins Hives         Current Outpatient Medications:   •  methylphenidate (Concerta) 54 MG CR tablet, Take 1 tablet by mouth Every Morning, Disp: 30 tablet, Rfl: 0  •  Methylphenidate HCl ER 54 MG tablet sustained-release 24 hour, Take 1 tablet by mouth Daily., Disp: 30 tablet, Rfl: 0  •  norgestimate-ethinyl estradiol (Sprintec 28) 0.25-35 MG-MCG per tablet, Take 1 tablet by mouth Daily., Disp: 28 tablet, Rfl: 12    Past Medical History:   Diagnosis Date   • IUD (intrauterine device) in place 2021    Kyleena   • Mental confusion    • Post concussive syndrome         Past Surgical History:   Procedure Laterality Date   • TONSILLECTOMY         Objective  /78   Ht 167.6 cm (66\")   Wt 68.2 kg (150 lb 6.4 oz)   LMP 2023 (Approximate)   Breastfeeding No   BMI 24.28 kg/m²     Physical Exam  Vitals and nursing note reviewed. Exam conducted with a chaperone present.   Constitutional:       General: She is not in acute distress.     Appearance: Normal appearance. She is not ill-appearing.   Abdominal:      Palpations: Abdomen is soft. There is no mass.      Tenderness: There is no abdominal tenderness.   Genitourinary:     General: Normal " vulva.      Labia:         Right: No rash, tenderness or lesion.         Left: No rash, tenderness or lesion.       Vagina: Normal. No vaginal discharge or erythema.      Cervix: No cervical motion tenderness, discharge, lesion or erythema.      Uterus: Normal. Not enlarged and not tender.       Adnexa: Right adnexa normal and left adnexa normal.        Right: No mass or tenderness.          Left: No mass or tenderness.        Rectum: Normal.      Comments: Kyleena strings are clean, ~2cm long.  They were grasped with Ring Forceps and the IUD was easily removed.  Pt tolerated well.  Normal bimanual exam.  No CMT or uterine or adnexal m-t.        Assessment/Plan   Diagnoses and all orders for this visit:    1. Encounter for IUD removal (Primary)    2. Encounter for initial prescription of contraceptive pills  -     norgestimate-ethinyl estradiol (Sprintec 28) 0.25-35 MG-MCG per tablet; Take 1 tablet by mouth Daily.  Dispense: 28 tablet; Refill: 12    3. Dysmenorrhea  -     norgestimate-ethinyl estradiol (Sprintec 28) 0.25-35 MG-MCG per tablet; Take 1 tablet by mouth Daily.  Dispense: 28 tablet; Refill: 12        Procedures            Return in about 3 months (around 6/22/2023) for Recheck 3 mo FU new start back on OCPs (after IUD removed).    Natalie Machado, JULIO  03/22/2023

## 2023-04-12 DIAGNOSIS — F07.81 POST CONCUSSIVE SYNDROME: ICD-10-CM

## 2023-04-12 RX ORDER — METHYLPHENIDATE HYDROCHLORIDE 54 MG/1
54 TABLET ORAL EVERY MORNING
Qty: 30 TABLET | Refills: 0 | Status: SHIPPED | OUTPATIENT
Start: 2023-04-12

## 2023-04-19 ENCOUNTER — PRIOR AUTHORIZATION (OUTPATIENT)
Dept: FAMILY MEDICINE CLINIC | Facility: CLINIC | Age: 28
End: 2023-04-19
Payer: COMMERCIAL

## 2023-04-19 NOTE — TELEPHONE ENCOUNTER
DESTIN ascencio Key: [id number] - PA Case ID: [id number] - Rx #: [id number]  Status  Sent to [facility]  Drug  Methylphenidate HCl ER TB24 54MG tablets

## 2023-04-28 DIAGNOSIS — F07.81 POST CONCUSSIVE SYNDROME: Primary | ICD-10-CM

## 2023-04-28 RX ORDER — METHYLPHENIDATE HYDROCHLORIDE 30 MG/1
30 TABLET, CHEWABLE, EXTENDED RELEASE ORAL DAILY
Qty: 30 EACH | Refills: 0 | Status: SHIPPED | OUTPATIENT
Start: 2023-04-28

## 2023-06-21 PROBLEM — Z30.014 ENCOUNTER FOR INITIAL PRESCRIPTION OF INTRAUTERINE CONTRACEPTIVE DEVICE (IUD): Status: RESOLVED | Noted: 2021-09-29 | Resolved: 2023-06-21

## 2023-08-15 DIAGNOSIS — F07.81 POST CONCUSSIVE SYNDROME: ICD-10-CM

## 2023-08-15 DIAGNOSIS — Z51.81 THERAPEUTIC DRUG MONITORING: ICD-10-CM

## 2023-08-15 RX ORDER — METHYLPHENIDATE HYDROCHLORIDE 54 MG/1
54 TABLET ORAL EVERY MORNING
Qty: 30 TABLET | Refills: 0 | Status: SHIPPED | OUTPATIENT
Start: 2023-08-15